# Patient Record
Sex: FEMALE | Race: WHITE | NOT HISPANIC OR LATINO | URBAN - METROPOLITAN AREA
[De-identification: names, ages, dates, MRNs, and addresses within clinical notes are randomized per-mention and may not be internally consistent; named-entity substitution may affect disease eponyms.]

---

## 2023-07-06 PROBLEM — G45.9 TIA (TRANSIENT ISCHEMIC ATTACK): Status: ACTIVE | Noted: 2019-05-31

## 2023-07-06 PROBLEM — E78.2 MIXED HYPERLIPIDEMIA: Status: ACTIVE | Noted: 2018-08-06

## 2023-07-06 PROBLEM — L65.9 HAIR LOSS: Status: ACTIVE | Noted: 2023-07-06

## 2023-07-06 PROBLEM — E89.0 POSTABLATIVE HYPOTHYROIDISM: Status: ACTIVE | Noted: 2023-07-06

## 2023-09-01 ENCOUNTER — TELEPHONE (OUTPATIENT)
Dept: ENDOCRINOLOGY | Facility: CLINIC | Age: 73
End: 2023-09-01
Payer: MEDICARE

## 2023-09-01 NOTE — TELEPHONE ENCOUNTER
Patient called wanting to know if her medication dose change should change due to her lab results that were faxed to us yesterday. Looks like her T4 was elevated. Please advise.

## 2023-09-05 ENCOUNTER — TELEPHONE (OUTPATIENT)
Dept: ENDOCRINOLOGY | Facility: CLINIC | Age: 73
End: 2023-09-05
Payer: MEDICARE

## 2023-09-05 DIAGNOSIS — E89.0 POSTABLATIVE HYPOTHYROIDISM: ICD-10-CM

## 2023-09-05 RX ORDER — LEVOTHYROXINE SODIUM 0.05 MG/1
TABLET ORAL
Qty: 12 TABLET | Refills: 5 | Status: SHIPPED | OUTPATIENT
Start: 2023-09-05

## 2023-09-05 RX ORDER — LEVOTHYROXINE SODIUM 0.07 MG/1
TABLET ORAL
Qty: 18 TABLET | Refills: 5 | Status: SHIPPED | OUTPATIENT
Start: 2023-09-05

## 2024-02-07 ENCOUNTER — OFFICE VISIT (OUTPATIENT)
Dept: ENDOCRINOLOGY | Facility: CLINIC | Age: 74
End: 2024-02-07
Payer: MEDICARE

## 2024-02-07 VITALS
SYSTOLIC BLOOD PRESSURE: 126 MMHG | BODY MASS INDEX: 23.79 KG/M2 | DIASTOLIC BLOOD PRESSURE: 70 MMHG | HEIGHT: 61 IN | WEIGHT: 126 LBS | HEART RATE: 74 BPM | OXYGEN SATURATION: 97 %

## 2024-02-07 DIAGNOSIS — E89.0 POSTABLATIVE HYPOTHYROIDISM: Primary | ICD-10-CM

## 2024-02-07 PROCEDURE — 84439 ASSAY OF FREE THYROXINE: CPT | Performed by: INTERNAL MEDICINE

## 2024-02-07 PROCEDURE — 84443 ASSAY THYROID STIM HORMONE: CPT | Performed by: INTERNAL MEDICINE

## 2024-02-07 NOTE — PROGRESS NOTES
"Chief Complaint   Patient presents with    Hypothyroidism        HPI   Lety Gamez is a 73 y.o. female had concerns including Hypothyroidism.      Still with hair loss.  Seems to have some regrowth.  Has fatigue.   On levothyroxine 50 mcg 3 days/week, 75 mcg 4 days/week.    Repeat TFTs are due.    The following portions of the patient's history were reviewed and updated as appropriate: allergies, current medications, past family history, past medical history, past social history, past surgical history, and problem list.    Review of Systems   Constitutional: Negative.  Positive for fatigue.   Endocrine: Negative.         Hair loss        /70   Pulse 74   Ht 154.9 cm (61\")   Wt 57.2 kg (126 lb)   SpO2 97%   BMI 23.81 kg/m²      Physical Exam  Vitals reviewed.   Constitutional:       Appearance: Normal appearance.   Neck:      Thyroid: No thyroid mass or thyromegaly.   Cardiovascular:      Rate and Rhythm: Normal rate.   Pulmonary:      Effort: Pulmonary effort is normal.   Neurological:      General: No focal deficit present.      Mental Status: She is alert. Mental status is at baseline.   Psychiatric:         Mood and Affect: Mood normal.         Behavior: Behavior normal.              LABS AND IMAGING   CMP  Lab Results   Component Value Date    GLUCOSE 120 (H) 07/06/2023    BUN 12 07/06/2023    CREATININE 0.89 07/06/2023    BCR 13.5 07/06/2023    K 4.8 07/06/2023    CO2 30.0 (H) 07/06/2023    CALCIUM 10.3 07/06/2023    ALBUMIN 4.5 07/06/2023    AST 23 07/06/2023    ALT 10 07/06/2023        CBC w/DIFF   Lab Results   Component Value Date    WBC 7.58 07/06/2023    RBC 4.72 07/06/2023    HGB 15.0 07/06/2023    HCT 43.8 07/06/2023    MCV 92.8 07/06/2023    MCH 31.8 07/06/2023    MCHC 34.2 07/06/2023    RDW 11.8 (L) 07/06/2023    RDWSD 40.4 07/06/2023    MPV 10.3 07/06/2023     07/06/2023    NEUTRORELPCT 63.9 05/29/2019    MONORELPCT 7.4 05/29/2019    EOSRELPCT 1.7 05/29/2019    BASORELPCT 0.4 " 05/29/2019    NEUTROABS 5.4 05/29/2019    LYMPHSABS 2.2 05/29/2019    MONOSABS 0.6 05/29/2019    EOSABS 0.1 05/29/2019    BASOSABS 0.0 05/29/2019     TSH  Lab Results   Component Value Date    TSH 0.175 (L) 07/06/2023     T4  Lab Results   Component Value Date    FREET4 1.24 07/06/2023 8/31/2023 CBC within normal limits, CMP with creatinine 0.74, GFR 86.98, LFTs normal, total cholesterol 151, HDL 68, LDL 61, triglycerides 110, TSH 0.63, free T4 2.01 with upper limit 1.7, Decreased from 75 mcg daily to 50 mcg x 3 days, 75 mcg x 4 days    Assessment and Plan    Diagnoses and all orders for this visit:    1. Postablative hypothyroidism (Primary)  Status post radioactive iodine therapy for Graves' disease in 2013.  In the past was on levothyroxine 50 mcg with liothyronine 5 mcg twice daily.  I changed her to a T4 only regimen of 75 mcg daily and reduced the dose in August 2023 for high T4 levels.  She is due for repeat TFTs.  Is clinically euthyroid.  Titrate levothyroxine if needed.  -     T4, Free  -     TSH         Return in about 6 months (around 8/7/2024) for next scheduled follow up. The patient was instructed to contact the clinic with any interval questions or concerns.    Mihaela Leigh, DO   Endocrinologist    Please note that portions of this note were completed with a voice recognition program.

## 2024-02-08 DIAGNOSIS — E89.0 POSTABLATIVE HYPOTHYROIDISM: ICD-10-CM

## 2024-02-08 LAB
T4 FREE SERPL-MCNC: 1.69 NG/DL (ref 0.93–1.7)
TSH SERPL DL<=0.05 MIU/L-ACNC: 1.78 UIU/ML (ref 0.27–4.2)

## 2024-02-08 RX ORDER — LEVOTHYROXINE SODIUM 0.07 MG/1
TABLET ORAL
Qty: 54 TABLET | Refills: 3 | Status: SHIPPED | OUTPATIENT
Start: 2024-02-08

## 2024-02-08 RX ORDER — LEVOTHYROXINE SODIUM 0.05 MG/1
TABLET ORAL
Qty: 36 TABLET | Refills: 3 | Status: SHIPPED | OUTPATIENT
Start: 2024-02-08

## 2025-02-12 ENCOUNTER — OFFICE VISIT (OUTPATIENT)
Dept: ENDOCRINOLOGY | Facility: CLINIC | Age: 75
End: 2025-02-12
Payer: MEDICARE

## 2025-02-12 VITALS
OXYGEN SATURATION: 97 % | HEIGHT: 62 IN | HEART RATE: 60 BPM | DIASTOLIC BLOOD PRESSURE: 74 MMHG | WEIGHT: 127 LBS | SYSTOLIC BLOOD PRESSURE: 122 MMHG | BODY MASS INDEX: 23.37 KG/M2

## 2025-02-12 DIAGNOSIS — E89.0 POSTABLATIVE HYPOTHYROIDISM: Primary | ICD-10-CM

## 2025-02-12 LAB
T4 FREE SERPL-MCNC: 1.4 NG/DL (ref 0.92–1.68)
TSH SERPL DL<=0.05 MIU/L-ACNC: 4.43 UIU/ML (ref 0.27–4.2)

## 2025-02-12 PROCEDURE — 84443 ASSAY THYROID STIM HORMONE: CPT | Performed by: INTERNAL MEDICINE

## 2025-02-12 PROCEDURE — 84439 ASSAY OF FREE THYROXINE: CPT | Performed by: INTERNAL MEDICINE

## 2025-02-12 NOTE — PROGRESS NOTES
"Chief Complaint   Patient presents with    Hypothyroidism        HPI   Lety Gamez is a 74 y.o. female had concerns including Hypothyroidism.      She is on levothyroxine 50 mcg 3 days/week, 75 mcg 4 days/week.  She feels fine on this regimen.  Due for labs.    The following portions of the patient's history were reviewed and updated as appropriate: allergies, current medications, past family history, past medical history, past social history, past surgical history, and problem list.    Review of Systems   Constitutional: Negative.    Endocrine: Negative.         /74   Pulse 60   Ht 157.5 cm (62\")   Wt 57.6 kg (127 lb)   SpO2 97%   BMI 23.23 kg/m²      Physical Exam  Vitals reviewed.   Constitutional:       Appearance: Normal appearance.   Neck:      Thyroid: No thyroid mass or thyromegaly.   Cardiovascular:      Rate and Rhythm: Normal rate.   Pulmonary:      Effort: Pulmonary effort is normal.   Neurological:      General: No focal deficit present.      Mental Status: She is alert. Mental status is at baseline.   Psychiatric:         Mood and Affect: Mood normal.         Behavior: Behavior normal.              LABS AND IMAGING   CMP  Lab Results   Component Value Date    GLUCOSE 120 (H) 07/06/2023    BUN 12 07/06/2023    CREATININE 0.89 07/06/2023    BCR 13.5 07/06/2023    K 4.8 07/06/2023    CO2 30.0 (H) 07/06/2023    CALCIUM 10.3 07/06/2023    ALBUMIN 4.5 07/06/2023    AST 23 07/06/2023    ALT 10 07/06/2023        CBC w/DIFF   Lab Results   Component Value Date    WBC 7.58 07/06/2023    RBC 4.72 07/06/2023    HGB 15.0 07/06/2023    HCT 43.8 07/06/2023    MCV 92.8 07/06/2023    MCH 31.8 07/06/2023    MCHC 34.2 07/06/2023    RDW 11.8 (L) 07/06/2023    RDWSD 40.4 07/06/2023    MPV 10.3 07/06/2023     07/06/2023    NEUTRORELPCT 63.9 05/29/2019    MONORELPCT 7.4 05/29/2019    EOSRELPCT 1.7 05/29/2019    BASORELPCT 0.4 05/29/2019    NEUTROABS 5.4 05/29/2019    LYMPHSABS 2.2 05/29/2019    MONOSABS " 0.6 05/29/2019    EOSABS 0.1 05/29/2019    BASOSABS 0.0 05/29/2019     TSH  Lab Results   Component Value Date    TSH 1.780 02/07/2024    TSH 0.175 (L) 07/06/2023     T4  Lab Results   Component Value Date    FREET4 1.69 02/07/2024    FREET4 1.24 07/06/2023 8/31/2023 CBC within normal limits, CMP with creatinine 0.74, GFR 86.98, LFTs normal, total cholesterol 151, HDL 68, LDL 61, triglycerides 110, TSH 0.63, free T4 2.01 with upper limit 1.7, Decreased from 75 mcg daily to 50 mcg x 3 days, 75 mcg x 4 days       Assessment and Plan    Diagnoses and all orders for this visit:    1. Postablative hypothyroidism (Primary)  -     T4, Free  -     TSH          Status post radioactive iodine therapy for Graves' disease in 2013.  History of nodules and benign FNA. Normal exam today.  In the past was on levothyroxine 50 mcg with liothyronine 5 mcg twice daily.  I changed her to a T4 only regimen of 75 mcg daily and reduced the dose in August 2023 for high T4 levels.  Currently on 50 mcg 3 days per week and 75 mcg 4 days per week.  She is due for repeat TFTs.  Is clinically euthyroid.  Titrate levothyroxine if needed.      Return in about 1 year (around 2/12/2026) for next scheduled follow up. The patient was instructed to contact the clinic with any interval questions or concerns.    Electronically signed by: Mihaela Leigh DO   Endocrinologist    Please note that portions of this note were completed with a voice recognition program.

## 2025-02-13 ENCOUNTER — TELEPHONE (OUTPATIENT)
Dept: ENDOCRINOLOGY | Facility: CLINIC | Age: 75
End: 2025-02-13
Payer: MEDICARE

## 2025-02-13 DIAGNOSIS — E89.0 POSTABLATIVE HYPOTHYROIDISM: ICD-10-CM

## 2025-02-13 RX ORDER — LEVOTHYROXINE SODIUM 75 UG/1
TABLET ORAL
Qty: 54 TABLET | Refills: 3 | Status: SHIPPED | OUTPATIENT
Start: 2025-02-13

## 2025-02-13 RX ORDER — LEVOTHYROXINE SODIUM 50 UG/1
TABLET ORAL
Qty: 36 TABLET | Refills: 3 | Status: SHIPPED | OUTPATIENT
Start: 2025-02-13

## 2025-02-13 NOTE — TELEPHONE ENCOUNTER
The TSH has drifted slightly above normal range but the T4 is at an optimal level. I would like to recheck these labs in about 2-3 months. Our office will mail a lab order and this can be completed locally. If you have not heard from our office about your lab results within 1 week after having them drawn, please call the office to ensure the results were received.    Refills were sent.    Spoke to pt-she voiced understanding.  Lab order mailed